# Patient Record
Sex: FEMALE | Race: WHITE | NOT HISPANIC OR LATINO | URBAN - METROPOLITAN AREA
[De-identification: names, ages, dates, MRNs, and addresses within clinical notes are randomized per-mention and may not be internally consistent; named-entity substitution may affect disease eponyms.]

---

## 2017-03-04 ENCOUNTER — OFFICE VISIT (OUTPATIENT)
Dept: FAMILY MEDICINE | Facility: CLINIC | Age: 67
End: 2017-03-04
Payer: MEDICARE

## 2017-03-04 VITALS
DIASTOLIC BLOOD PRESSURE: 76 MMHG | HEART RATE: 79 BPM | BODY MASS INDEX: 28.71 KG/M2 | WEIGHT: 156 LBS | SYSTOLIC BLOOD PRESSURE: 126 MMHG | TEMPERATURE: 98.7 F | OXYGEN SATURATION: 98 % | HEIGHT: 62 IN

## 2017-03-04 DIAGNOSIS — J06.9 UPPER RESPIRATORY TRACT INFECTION, UNSPECIFIED TYPE: Primary | ICD-10-CM

## 2017-03-04 LAB
FLUAV+FLUBV AG SPEC QL: NEGATIVE
FLUAV+FLUBV AG SPEC QL: NORMAL
SPECIMEN SOURCE: NORMAL

## 2017-03-04 PROCEDURE — 99202 OFFICE O/P NEW SF 15 MIN: CPT | Performed by: PHYSICIAN ASSISTANT

## 2017-03-04 PROCEDURE — 87804 INFLUENZA ASSAY W/OPTIC: CPT | Performed by: PHYSICIAN ASSISTANT

## 2017-03-04 NOTE — PATIENT INSTRUCTIONS
I'm sorry you're not feeling well.  Symptoms and exam findings are most consistent with a viral process, but negative for flu at this time.  Clinically sx sound less consistent with this as well.   Treatment is supportive.  Re-check anytime with failure to improve as expected or with new concerns.    Electronically Signed By: Bren Trevino PA-C

## 2017-03-04 NOTE — PROGRESS NOTES
SUBJECTIVE:                                                    Silva Patel is a 66 year old female who presents to clinic today for the following health issues:    Acute Illness   Acute illness concerns: URI  Non-smoker  Spring/fall allergies, but helps to have her CPAP.  No hx of asthma or pneumonia.  Felt well and then all of a sudden had rhinorrhea and congestion.  Not too much for body aches.  No fever that she knows of.  Up-to-date on flu vaccine and pneumonia vaccine.  PCP is in [place] - visiting sister from there then flew to [facility] and back yesterday.  No painful breathing.     Onset: 3 days    Fever: no    Chills/Sweats: no    Headache (location?): no    Sinus Pressure:YES    Conjunctivitis:  YES: bilateral    Ear Pain: YES: left    Rhinorrhea: YES    Congestion: no    Sore Throat: YES     Cough: YES-non-productive    Wheeze: no    Decreased Appetite: YES    Nausea: no    Vomiting: no    Diarrhea:  no    Dysuria/Freq.: no    Fatigue/Achiness: YES    Sick/Strep Exposure: no     Therapies Tried and outcome: steve cordova with minor results - taken at 9am      Problem list and histories reviewed & adjusted, as indicated.  Additional history: as documented    There is no problem list on file for this patient.    History reviewed. No pertinent past surgical history.    Social History   Substance Use Topics     Smoking status: Never Smoker     Smokeless tobacco: Not on file     Alcohol use Yes      Comment: 2 drinks per month avg     Family History   Problem Relation Age of Onset     HEART DISEASE Father          Current Outpatient Prescriptions   Medication Sig Dispense Refill     Venlafaxine HCl (EFFEXOR PO) Take 25 mg by mouth daily       TRAZODONE HCL PO Take 25 mg by mouth At Bedtime       Allergies   Allergen Reactions     Penicillins      tingling     Sulfa Drugs Rash       Reviewed and updated as needed this visit by clinical staff  Tobacco  Allergies  Meds  Med Hx  Surg Hx  Fam Hx  Soc Hx     "  Reviewed and updated as needed this visit by Provider  Tobacco  Allergies  Meds  Med Hx  Surg Hx  Fam Hx  Soc Hx        ROS:  Constitutional, HEENT, cardiovascular, pulmonary, gi and gu systems are negative, except as otherwise noted.    OBJECTIVE:                                                    /76  Pulse 79  Temp 98.7  F (37.1  C) (Oral)  Ht 5' 2\" (1.575 m)  Wt 156 lb (70.8 kg)  SpO2 98%  BMI 28.53 kg/m2  Body mass index is 28.53 kg/(m^2).  GENERAL: healthy, alert and no distress  EYES: Eyes grossly normal to inspection, PERRL and conjunctivae and sclerae normal  HENT: ear canals and TM's normal, nose and mouth without ulcers or lesions. Nares mildly red with clear rhinorrhea and congestion.   NECK: no adenopathy, no asymmetry, masses, or scars and thyroid normal to palpation  RESP: lungs clear to auscultation - no rales, rhonchi or wheezes  CV: regular rate and rhythm, normal S1 S2, no S3 or S4, no murmur, click or rub, no peripheral edema and peripheral pulses strong    Diagnostic Test Results:  Results for orders placed or performed in visit on 03/04/17 (from the past 24 hour(s))   Influenza A/B antigen   Result Value Ref Range    Influenza A/B Agn Specimen Nasal     Influenza A Negative NEG    Influenza B  NEG     Negative   Test results must be correlated with clinical data. If necessary, results   should be confirmed by a molecular assay or viral culture.          ASSESSMENT/PLAN:                                                        ICD-10-CM    1. Upper respiratory tract infection, unspecified type J06.9    Concerns about flu given travelling, but hx does not suggest this given no fevers or significant body aches.  Neg testing today as well.  See Patient Instructions  Patient Instructions   I'm sorry you're not feeling well.  Symptoms and exam findings are most consistent with a viral process, but negative for flu at this time.  Clinically sx sound less consistent with this as well. "   Treatment is supportive.  Re-check anytime with failure to improve as expected or with new concerns.    Electronically Signed By: Bren Trevino PA-C

## 2017-03-04 NOTE — MR AVS SNAPSHOT
"              After Visit Summary   3/4/2017    Silva Patel    MRN: [id number]           Patient Information     Date Of Birth          1950        Visit Information        Provider Department      3/4/2017 10:40 AM Bren Trevino PA-C [facility]        Today's Diagnoses     Upper respiratory tract infection, unspecified type    -  1    Body aches          Care Instructions    I'm sorry you're not feeling well.  Symptoms and exam findings are most consistent with a viral process, but negative for flu at this time.  Clinically sx sound less consistent with this as well.   Treatment is supportive.  Re-check anytime with failure to improve as expected or with new concerns.    Electronically Signed By: Bren Trevino PA-C          Follow-ups after your visit        Who to contact     If you have questions or need follow up information about today's clinic visit or your schedule please contact [facility] directly at [phone].  Normal or non-critical lab and imaging results will be communicated to you by Verutahart, letter or phone within 4 business days after the clinic has received the results. If you do not hear from us within 7 days, please contact the clinic through Validust or phone. If you have a critical or abnormal lab result, we will notify you by phone as soon as possible.  Submit refill requests through Factual or call your pharmacy and they will forward the refill request to us. Please allow 3 business days for your refill to be completed.          Additional Information About Your Visit        Verutahart Information     Factual lets you send messages to your doctor, view your test results, renew your prescriptions, schedule appointments and more. To sign up, go to [email or web address] . Click on \"Log in\" on the left side of the screen, which will take you to the Welcome page. Then click on \"Sign up Now\" on the right side of the page.     You will be " "asked to enter the access code listed below, as well as some personal information. Please follow the directions to create your username and password.     Your access code is: [email or web address]  Expires: 2017 11:50 AM     Your access code will  in 90 days. If you need help or a new code, please call your [facility] clinic or [phone].        Care EveryWhere ID     This is your Care EveryWhere ID. This could be used by other organizations to access your [facility] medical records  [id number]        Your Vitals Were     Pulse Temperature Height Pulse Oximetry BMI (Body Mass Index)       79 98.7  F (37.1  C) (Oral) 5' 2\" (1.575 m) 98% 28.53 kg/m2        Blood Pressure from Last 3 Encounters:   17 126/76    Weight from Last 3 Encounters:   17 156 lb (70.8 kg)              We Performed the Following     Influenza A/B antigen        Primary Care Provider    None       No address on file        Thank you!     Thank you for choosing [facility]  for your care. Our goal is always to provide you with excellent care. Hearing back from our patients is one way we can continue to improve our services. Please take a few minutes to complete the written survey that you may receive in the mail after your visit with us. Thank you!             Your Updated Medication List - Protect others around you: Learn how to safely use, store and throw away your medicines at www.disposemymeds.org.          This list is accurate as of: 3/4/17 11:50 AM.  Always use your most recent med list.                   Brand Name Dispense Instructions for use    EFFEXOR PO      Take 25 mg by mouth daily       TRAZODONE HCL PO      Take 25 mg by mouth At Bedtime         "

## 2017-03-04 NOTE — NURSING NOTE
"Chief Complaint   Patient presents with     URI       Initial /76  Pulse 79  Temp 98.7  F (37.1  C) (Oral)  Ht 5' 2\" (1.575 m)  Wt 156 lb (70.8 kg)  SpO2 98%  BMI 28.53 kg/m2 Estimated body mass index is 28.53 kg/(m^2) as calculated from the following:    Height as of this encounter: 5' 2\" (1.575 m).    Weight as of this encounter: 156 lb (70.8 kg)..  BP completed using cuff size: dilip Li MA  "

## 2021-05-21 NOTE — Clinical Note
Coded right? Electronically Signed By: Bren Trevino PA-C  [facility] Counseling:  I discussed with the patient the risks of ustekinumab including but not limited to immunosuppression, malignancy, posterior leukoencephalopathy syndrome, and serious infections.  The patient understands that monitoring is required including a PPD at baseline and must alert us or the primary physician if symptoms of infection or other concerning signs are noted.